# Patient Record
Sex: FEMALE | Race: ASIAN | Employment: UNEMPLOYED | URBAN - METROPOLITAN AREA
[De-identification: names, ages, dates, MRNs, and addresses within clinical notes are randomized per-mention and may not be internally consistent; named-entity substitution may affect disease eponyms.]

---

## 2019-10-11 ENCOUNTER — IMMUNIZATION (OUTPATIENT)
Dept: FAMILY MEDICINE CLINIC | Facility: CLINIC | Age: 29
End: 2019-10-11
Payer: COMMERCIAL

## 2019-10-11 DIAGNOSIS — Z23 NEED FOR VACCINATION: ICD-10-CM

## 2019-10-11 PROCEDURE — 90471 IMMUNIZATION ADMIN: CPT | Performed by: PHYSICIAN ASSISTANT

## 2019-10-11 PROCEDURE — 90686 IIV4 VACC NO PRSV 0.5 ML IM: CPT | Performed by: PHYSICIAN ASSISTANT

## 2021-04-17 ENCOUNTER — LAB ENCOUNTER (OUTPATIENT)
Dept: LAB | Age: 31
End: 2021-04-17
Attending: FAMILY MEDICINE
Payer: COMMERCIAL

## 2021-04-17 ENCOUNTER — OFFICE VISIT (OUTPATIENT)
Dept: FAMILY MEDICINE CLINIC | Facility: CLINIC | Age: 31
End: 2021-04-17
Payer: COMMERCIAL

## 2021-04-17 VITALS
DIASTOLIC BLOOD PRESSURE: 72 MMHG | TEMPERATURE: 98 F | BODY MASS INDEX: 24.84 KG/M2 | HEIGHT: 62 IN | WEIGHT: 135 LBS | HEART RATE: 76 BPM | RESPIRATION RATE: 16 BRPM | SYSTOLIC BLOOD PRESSURE: 108 MMHG | OXYGEN SATURATION: 99 %

## 2021-04-17 DIAGNOSIS — Z01.84 IMMUNITY STATUS TESTING: ICD-10-CM

## 2021-04-17 DIAGNOSIS — E55.9 HYPOVITAMINOSIS D: ICD-10-CM

## 2021-04-17 DIAGNOSIS — Z00.00 WELL ADULT EXAM: Primary | ICD-10-CM

## 2021-04-17 DIAGNOSIS — Z30.41 ENCOUNTER FOR SURVEILLANCE OF CONTRACEPTIVE PILLS: ICD-10-CM

## 2021-04-17 DIAGNOSIS — Z00.00 WELL ADULT EXAM: ICD-10-CM

## 2021-04-17 DIAGNOSIS — E04.9 ENLARGED THYROID: ICD-10-CM

## 2021-04-17 PROBLEM — Q24.9: Status: ACTIVE | Noted: 2020-10-05

## 2021-04-17 PROBLEM — Q24.9 HEART DEFECT, CONGENITAL: Status: ACTIVE | Noted: 2020-10-05

## 2021-04-17 PROCEDURE — 86658 ENTEROVIRUS ANTIBODY: CPT

## 2021-04-17 PROCEDURE — 86787 VARICELLA-ZOSTER ANTIBODY: CPT

## 2021-04-17 PROCEDURE — 86765 RUBEOLA ANTIBODY: CPT

## 2021-04-17 PROCEDURE — 84443 ASSAY THYROID STIM HORMONE: CPT

## 2021-04-17 PROCEDURE — 85025 COMPLETE CBC W/AUTO DIFF WBC: CPT | Performed by: FAMILY MEDICINE

## 2021-04-17 PROCEDURE — 84439 ASSAY OF FREE THYROXINE: CPT

## 2021-04-17 PROCEDURE — 3074F SYST BP LT 130 MM HG: CPT | Performed by: FAMILY MEDICINE

## 2021-04-17 PROCEDURE — 80061 LIPID PANEL: CPT | Performed by: FAMILY MEDICINE

## 2021-04-17 PROCEDURE — 86735 MUMPS ANTIBODY: CPT

## 2021-04-17 PROCEDURE — 86480 TB TEST CELL IMMUN MEASURE: CPT

## 2021-04-17 PROCEDURE — 36415 COLL VENOUS BLD VENIPUNCTURE: CPT

## 2021-04-17 PROCEDURE — 80053 COMPREHEN METABOLIC PANEL: CPT | Performed by: FAMILY MEDICINE

## 2021-04-17 PROCEDURE — 3078F DIAST BP <80 MM HG: CPT | Performed by: FAMILY MEDICINE

## 2021-04-17 PROCEDURE — 99385 PREV VISIT NEW AGE 18-39: CPT | Performed by: FAMILY MEDICINE

## 2021-04-17 PROCEDURE — 86762 RUBELLA ANTIBODY: CPT

## 2021-04-17 PROCEDURE — 82306 VITAMIN D 25 HYDROXY: CPT | Performed by: FAMILY MEDICINE

## 2021-04-17 PROCEDURE — 3008F BODY MASS INDEX DOCD: CPT | Performed by: FAMILY MEDICINE

## 2021-04-17 PROCEDURE — 86706 HEP B SURFACE ANTIBODY: CPT

## 2021-04-17 RX ORDER — NORGESTIMATE AND ETHINYL ESTRADIOL 7DAYSX3 LO
1 KIT ORAL DAILY
Qty: 1 PACKAGE | Refills: 3 | Status: SHIPPED | OUTPATIENT
Start: 2021-04-17 | End: 2021-07-10

## 2021-04-17 RX ORDER — NORGESTIMATE AND ETHINYL ESTRADIOL 0.25-0.035
1 KIT ORAL DAILY
COMMUNITY
Start: 2021-03-30 | End: 2021-04-17

## 2021-04-22 ENCOUNTER — TELEPHONE (OUTPATIENT)
Dept: FAMILY MEDICINE CLINIC | Facility: CLINIC | Age: 31
End: 2021-04-22

## 2021-04-22 ENCOUNTER — PATIENT MESSAGE (OUTPATIENT)
Dept: FAMILY MEDICINE CLINIC | Facility: CLINIC | Age: 31
End: 2021-04-22

## 2021-04-22 NOTE — TELEPHONE ENCOUNTER
Spoke to patient. Patient advised she will need meningococcal vaccine. Received 2nd dose of covid vaccine on 4/12/21. Patient advised to wait until 4/27 for vaccine.    Patient wants to schedule RN appt but would have to bring her 35 month old baby and

## 2021-04-22 NOTE — TELEPHONE ENCOUNTER
Meningitis needs to be completed.  She had Covid vaccine on 4-12 can you call and explain when she can get vaccines

## 2021-04-22 NOTE — TELEPHONE ENCOUNTER
Sent [employer] message requesting update on meningitis and polio vaccine records. Form placed in my red patient ppw folder.

## 2021-04-22 NOTE — TELEPHONE ENCOUNTER
From: [facility]  To: Matty Bates DO  Sent: 4/22/2021 2:01 PM CDT  Subject: Other    Hello,    Attached are my records for polio immunization. It seems like I need the meningitis.     Best,    [facility]

## 2021-04-23 NOTE — ADDENDUM NOTE
Addended by: Julienne Pitts on: 4/23/2021 09:40 AM     Modules accepted: Orders COMPLETE PHYSICAL EXAMINATION      HISTORY    The patient is a 64 year old female who comes in for a complete physical exam.  She is overdue for a mammogram and this has been ordered.  Cologuard was negative in October of 2018.  Last Pap smear was in July of 2017 and was normal and HPV negative.  Tdap was in 2018. She has a history of hypertension and tachycardia for which she takes metoprolol succinate 25 mg 2 tablets in the morning and 1 tablet at night.  Blood pressure has been elevated at home and her resting heart rate is typically in the 80s to 90s.  She does feel anxious.  Her mother has frontal lobe dementia and is living at an assisted living facility.  This has been a difficult transition for the patient as she is still an active caretaker.  She often feels up tight and this feeling has been going on for months.  She denies any depression.  She thinks her anxiety is contributing to her hypertension.          MEDICATIONS  Current Outpatient Medications   Medication Sig   • metoPROLOL succinate (TOPROL-XL) 25 MG 24 hr tablet TAKE 2 TABLETS BID   • cholecalciferol (VITAMIN D3) 1000 UNITS tablet Take 1,000 Units by mouth daily.   • MAGNESIUM CITRATE PO    • Probiotic Product (PROBIOTIC DAILY PO)    • aspirin 81 MG tablet Take 81 mg by mouth daily.   • FLUoxetine (PROzac) 10 MG capsule Take 1 capsule by mouth daily.     No current facility-administered medications for this visit.       ALLERGIES  Allergies as of 04/23/2021   • (No Known Allergies)       HISTORIES  Past Medical History:   Diagnosis Date   • Diverticulitis    • Palpitations    • Thrombocytosis (CMS/HCC)        History reviewed. No pertinent surgical history.       Family History   Problem Relation Age of Onset   • Hypertension Mother    • Dementia/Alzheimers Mother    • Hypertension Father    • Parkinsonism Father    • Psoriasis Sister         with arthritis       Social History     Occupational History   • Occupation: Professor   Tobacco Use   •  Smoking status: Never Smoker   • Smokeless tobacco: Never Used   Substance and Sexual Activity   • Alcohol use: No     Comment: rarely    • Drug use: No   • Sexual activity: Yes     Birth control/protection: Post-menopausal     Comment: LMP at 46       REVIEW OF SYSTEMS    Constitutional:  Denies weight loss, generalized fatigue, chills, night sweats.  Eyes:  Denies change in visual acuity, denies diplopia, denies photophobia.   HENT:  Denies hearing loss, tinnitus, chronic nasal congestion, sore throat, change in voice.  Respiratory:  Denies shortness of breath, chronic cough, sputum production, hemoptysis.  Cardiovascular:  Denies chest pain, palpitations, dyspnea on exertion, claudication symptoms.  Gastrointestinal:  Denies difficulty swallowing, abdominal pain, diarrhea, constipation, melena, changes in bowel habits.  Genitourinary:  Denies dysuria, hematuria, frequency, urinary incontinence, hesitancy, weak stream.  Musculoskeletal:  Denies back pain or joint pain.  Integument:  Denies rash, changes in moles, no lesions seen.  Neurologic:  Denies headache, focal weakness or sensory changes.   Endocrine:  Denies polyuria or polydipsia, denies temperature intolerance.   Lymphatic:  Denies swollen glands.   Psychiatric:  See HPI.     PHYSICAL EXAM  Vitals:  Blood pressure (!) 152/80, pulse 78, temperature 97.8 °F (36.6 °C), temperature source Temporal, resp. rate 16, height 5' 3.5\" (1.613 m), weight 81.3 kg. Body mass index is 31.25 kg/m².  General:  Well-developed and well nourished.  In no acute distress.   Skin:  No rash or unusual nevi.  HEENT:  Pupils equal, round, reactive to light and accommodation, extraocular movements intact.  The external auditory canals and tympanic membranes are normal bilaterally.  Nares are clear.  The oral mucosa is moist and without lesions.  The pharynx is clear.  Neck:  No masses, thyromegaly or adenopathy.  Breasts:  The breasts are without masses, skin retraction, or nipple  discharge.  There is no axillary adenopathy bilaterally.  Lungs:  Clear to auscultation.  No rales, rhonchi or wheezes.  Heart::  Regular rate and rhythm.  Normal S1 and S2 without S3 or S4.  No murmur, rub or gallop.  Back:  Normal thoracic kyphosis and lumbar lordosis.  Normal straight leg raises bilaterally.   Abdomen:  Soft, non-distended, non-tender.  No hepatomegaly or splenomegaly.  No masses.  Extremities:  Normal strength and tone.  No joint pain on range of motion.  No peripheral edema.      ASSESSMENT/PLAN  63 y/o female here for annual exam.  Screening mammogram, tshr, flp, cmp and cbc ordered. Metoprolol succinate increased to a total of 50 mg po bid for better control of her htn.  I also gave her a script for fluoxetine 10 mg po daily to help improve her anxiety.  I chose this over other SSRIs as she is concerned about wt gain and fatigue and this SSRI is a bit more wt neutral and activating.  She is to return to see me in June or sooner if needed.

## 2021-04-23 NOTE — TELEPHONE ENCOUNTER
The order has been signed I am still waiting for the polio virus antibody.   If she has immunization record of the polio virus that would be great as her school form does want to see immunization record for the polio

## 2021-04-26 ENCOUNTER — TELEPHONE (OUTPATIENT)
Dept: FAMILY MEDICINE CLINIC | Facility: CLINIC | Age: 31
End: 2021-04-26

## 2021-04-26 NOTE — TELEPHONE ENCOUNTER
Dr. Oshea Sides calling regarding pt's polio immunity status being non-immune. Dr. Oshea Sides requesting pt's vaccine records to be obtained and recommending pt have polio boosters.

## 2021-04-26 NOTE — TELEPHONE ENCOUNTER
Pt's vaccine records obtained. Waiting on clarification from Dr. Tressa Myles regarding the need for Polio boosters.

## 2021-04-26 NOTE — TELEPHONE ENCOUNTER
Per Dr. Leonardo Love pt does not need Polio booster after obtaining pt immunization record including Polio.

## 2021-04-27 ENCOUNTER — NURSE ONLY (OUTPATIENT)
Dept: FAMILY MEDICINE CLINIC | Facility: CLINIC | Age: 31
End: 2021-04-27
Payer: COMMERCIAL

## 2021-04-27 PROCEDURE — 90734 MENACWYD/MENACWYCRM VACC IM: CPT | Performed by: FAMILY MEDICINE

## 2021-04-27 PROCEDURE — 90471 IMMUNIZATION ADMIN: CPT | Performed by: FAMILY MEDICINE

## 2021-04-27 NOTE — PROGRESS NOTES
Dr. [facility] presents today for nurse visit. Menveo given IM in right deltoid. Patient tolerated well. Gave copy of completed paperwork. Left office in stable condition.

## 2022-02-15 ENCOUNTER — TELEMEDICINE (OUTPATIENT)
Dept: FAMILY MEDICINE CLINIC | Facility: CLINIC | Age: 32
End: 2022-02-15

## 2022-02-15 DIAGNOSIS — R42 POSTURAL DIZZINESS: ICD-10-CM

## 2022-02-15 DIAGNOSIS — R53.1 WEAKNESS: ICD-10-CM

## 2022-02-15 DIAGNOSIS — G25.2 POSTURAL TREMOR: Primary | ICD-10-CM

## 2022-02-15 PROCEDURE — 99213 OFFICE O/P EST LOW 20 MIN: CPT | Performed by: FAMILY MEDICINE

## 2022-02-15 NOTE — PROGRESS NOTES
Subjective    This visit is conducted using Telemedicine with live, interactive video and audio. Chief Complaint:  Patient presents with:  Weakness        The patient confirmed knowledge of the limitations of the use of telemedicine were verbally confirmed by the provider. Verification of patient identity was established. Verbal consent was obtained for medical treatment in lieu of coronavirus related emergency. Patient understands and accepts financial responsibility for any deductible, co-insurance and/or co-pays associated with this service. Patient complains of:  Ethan Chua is a 28year old female whois here for evaluation of dizziness. The dizziness has been present for several months but initial onset was several years ago The patient describes the symptoms as dizziness, tremors, weakness. Symptoms are exacerbated by rising from supine position. denies ear symptoms. She denies pressure  Otalgia otorrhea tinnitus hearing loss. She was diagnosed with bppv by a neurologist years ago and had done PT but that never helped. Feels that her upper body is swaying when she stands up. She has no family hx of neurological disease. No medical hx. She does have numbness and tingling diffusely. Feels weakness in her upper thoracic muscle. Notices tremors of her arms and tingling sensation periorally. When she is standing feels lik her postureal weakness. No new medications. No drug use or smoking  She is in medical school. Denies stress/anxiety out of the ordinary. No dietary change. Never had MRI. HISTORY:  History reviewed. No pertinent past medical history. History reviewed. No pertinent surgical history. History reviewed. No pertinent family history.    Social History    Tobacco Use      Smoking status: Never Smoker      Smokeless tobacco: Never Used    Vaping Use      Vaping Use: Never used    Alcohol use: Never    Drug use: Never       Review of Systems   Constitutional: Negative foratigue. No distress. Eyes: Negative for pain and visual disturbance. Respiratory: Negative for cough, chest tightness, shortness of breath and wheezing. Cardiovascular: Negative for chest pain, palpitations and leg swelling. Gastrointestinal: Negative for nausea, vomiting, abdominal pain  Genitourinary: Negative for dysuria, hematuria and difficulty urinating. Musculoskeletal: Negative for myalgias, back pain, joint swelling, arthralgias  Skin: Negative for color change and rash. Neurological: Negative for  syncope, headaches. Objective    Physical Exam:  alert, appears stated age and cooperative, Speaking in full sentences comfortably, Normal work of breathing and age appropriate and normal  Neuro: Alert/oriented x3, speech is fluent, face symmetric  Psych: Mood is stable, Affect appropriate    Assessment/Plan:    Patient with symptoms of dizziness, postural disturbance, swaying, tremors, diffuse numbness tingling, worsening symptoms. R/o anemia, metabolic deficiency, thyroid disease, infection. Referred to Neuro for further workup. 1. Postural tremor  - NEURO - INTERNAL  - CBC WITH DIFFERENTIAL WITH PLATELET  - COMP METABOLIC PANEL (14)  - TSH W REFLEX TO FREE T4  - FERRITIN  - IRON AND TIBC  - VITAMIN L24  - FOLIC ACID SERUM(FOLATE)    2. Weakness  - NEURO - INTERNAL  - CBC WITH DIFFERENTIAL WITH PLATELET  - COMP METABOLIC PANEL (14)  - TSH W REFLEX TO FREE T4  - FERRITIN  - IRON AND TIBC  - VITAMIN O94  - FOLIC ACID SERUM(FOLATE)    3. Postural dizziness  - NEURO - INTERNAL  - CBC WITH DIFFERENTIAL WITH PLATELET  - COMP METABOLIC PANEL (14)  - TSH W REFLEX TO FREE T4  - FERRITIN  - IRON AND TIBC  - VITAMIN L34  - FOLIC ACID SERUM(FOLATE)      Diagnostic rationale, follow up instructions, and strict precautions/indications for emergent direct evaluation were discussed with the patient.  The patient agrees with the plan, and understands to follow up with their primary care physician or other healthcare provider within 48-72 hours for reevaluation for persistent or worsening symptoms. Patient understands phone evaluation is not a substitute for face-to-face examination or emergency care. Patient advised to go to ER or call 911 for worsening symptoms or acute distress.          Dwight Pedro,

## 2022-03-31 ENCOUNTER — TELEMEDICINE (OUTPATIENT)
Dept: FAMILY MEDICINE CLINIC | Facility: CLINIC | Age: 32
End: 2022-03-31

## 2022-03-31 DIAGNOSIS — H81.13 BENIGN PAROXYSMAL POSITIONAL VERTIGO DUE TO BILATERAL VESTIBULAR DISORDER: Primary | ICD-10-CM

## 2022-03-31 PROCEDURE — 99442 PHONE E/M BY PHYS 11-20 MIN: CPT | Performed by: FAMILY MEDICINE

## 2022-03-31 NOTE — PROGRESS NOTES
VIRTUAL/TELEPHONE ENCOUNTER    Subjective    This visit is conducted using live telephone encounter with patient due to [facility] emergency. Chief Complaint:  Patient presents with:  Vertigo        The patient confirmed knowledge of the limitations of the use of telemedicine were verbally confirmed by the provider. Verification of patient identity was established. Verbal consent was obtained for medical treatment in lieu of coronavirus related emergency. Patient complains of:  71-year-old female who has a history of BPPV presenting for a follow-up. Patient was referred to see a neurologist couple months ago but unfortunately was not able to see them. She is requesting to see a female neurologist possibly. She has been having vertigo that is worsening. 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 She is also had some more stress related to school work and also medical condition with her son. Overall her vertigo has worsened. In school when she has labs she will need to sit on the stool but sitting in certain positions on the stool will give her dizziness. She reports rotary motion. He denies any syncope. Denies any cardiac history. She has a history of BPPV for which she underwent vestibular rehab. She would like to do this again and needs a referral for it. Patient has tried Dramamine but it made her symptoms worse. Review of Systems   Constitutional: Negative for fever, chills and fatigue. No distress. HENT: Negative for hearing loss, congestion, sore throat  Neurological: Negative for   syncope, weakness, numbness, tingling and headaches. Objective    Physical Exam:  alert and cooperative, Speaking in full sentences comfortably and Normal work of breathing    Assessment/Plan:    1. Benign paroxysmal positional vertigo due to bilateral vestibular disorder  Referral given for her to see neurology.   I have also referred her to see PT for vestibular rehab.  Notify if symptoms worsen. - OP REFERRAL TO [facility]  - NEURO - INTERNAL    Diagnostic rationale, follow up instructions, and strict precautions/indications for emergent direct evaluation were discussed with the patient. The patient agrees with the plan, and understands to follow up with their primary care physician or other healthcare provider within 48-72 hours for reevaluation for persistent or worsening symptoms. . Patient understands phone evaluation is not a substitute for face-to-face examination or emergency care. Patient advised to go to ER or call 911 for worsening symptoms or acute distress. Virtual/Telephone Check-In    Patient  verbally consents to a Virtual/Telephone Check-In service on 03/31/22  Patient understands and accepts financial responsibility for any deductible, co-insurance and/or co-pays associated with this service. TE done instead of ov due to COVID-19 emergency.      Duration of the service: 11 minutes were spent with the patient          Lise Hill DO

## 2022-06-21 ENCOUNTER — PATIENT MESSAGE (OUTPATIENT)
Dept: FAMILY MEDICINE CLINIC | Facility: CLINIC | Age: 32
End: 2022-06-21

## 2022-06-23 ENCOUNTER — PATIENT MESSAGE (OUTPATIENT)
Dept: FAMILY MEDICINE CLINIC | Facility: CLINIC | Age: 32
End: 2022-06-23

## 2022-06-23 ENCOUNTER — TELEPHONE (OUTPATIENT)
Dept: FAMILY MEDICINE CLINIC | Facility: CLINIC | Age: 32
End: 2022-06-23

## 2022-06-23 NOTE — TELEPHONE ENCOUNTER
Pt scheduled RN appt.    TB pended  Future Appointments   Date Time Provider Kim Sierra   6/24/2022 11:15 AM [facility] 20 NURSE [facility] 20 [facility] [address]

## 2022-06-23 NOTE — TELEPHONE ENCOUNTER
Pt scheduled for TB test tomorrow 6/22/22, please call pt to schedule TB read Monday at/before 11:15 am.  If pt cannot make that read appt, TB test appointment will need to be rescheduled. Thank you.

## 2022-06-24 ENCOUNTER — NURSE ONLY (OUTPATIENT)
Dept: FAMILY MEDICINE CLINIC | Facility: CLINIC | Age: 32
End: 2022-06-24
Payer: COMMERCIAL

## 2022-06-24 PROCEDURE — 86580 TB INTRADERMAL TEST: CPT | Performed by: FAMILY MEDICINE

## 2022-06-24 NOTE — PROGRESS NOTES
[facility] presents today for nurse visit. TB test given intradermal in right forearm. Patient tolerated well. Left office in stable condition.    Scheduled TB read  Future Appointments   Date Time Provider Kim Sierra   6/27/2022  9:30 AM [facility] 20 NURSE [facility] 20 [facility] [address]

## 2022-06-27 ENCOUNTER — NURSE ONLY (OUTPATIENT)
Dept: FAMILY MEDICINE CLINIC | Facility: CLINIC | Age: 32
End: 2022-06-27
Payer: COMMERCIAL

## 2022-06-27 DIAGNOSIS — Z11.1 TUBERCULOSIS SCREENING: Primary | ICD-10-CM

## 2022-06-27 LAB — INDURATION (): 0 MM (ref 0–11)

## 2022-07-18 ENCOUNTER — PATIENT MESSAGE (OUTPATIENT)
Dept: FAMILY MEDICINE CLINIC | Facility: CLINIC | Age: 32
End: 2022-07-18

## 2022-07-22 ENCOUNTER — PATIENT MESSAGE (OUTPATIENT)
Dept: FAMILY MEDICINE CLINIC | Facility: CLINIC | Age: 32
End: 2022-07-22

## 2022-07-28 ENCOUNTER — TELEMEDICINE (OUTPATIENT)
Dept: FAMILY MEDICINE CLINIC | Facility: CLINIC | Age: 32
End: 2022-07-28

## 2022-07-28 DIAGNOSIS — H81.13 BENIGN PAROXYSMAL POSITIONAL VERTIGO DUE TO BILATERAL VESTIBULAR DISORDER: Primary | ICD-10-CM

## 2022-07-28 PROCEDURE — 99213 OFFICE O/P EST LOW 20 MIN: CPT | Performed by: FAMILY MEDICINE

## 2022-07-28 NOTE — PROGRESS NOTES
VIRTUAL/TELEMEDICINE ENCOUNTER    Subjective       This visit is conducted using Telemedicine with live, interactive video and audio. Chief Complaint:  Patient presents with: Follow - Up        The patient confirmed knowledge of the limitations of the use of telemedicine were verbally confirmed by the provider. Verification of patient identity was established. Verbal consent was obtained for medical treatment in lieu of coronavirus related emergency. HPI:  28-year-old female with a history of BPPV presenting for a follow-up. She has tried to use Dramamine for the symptoms but that made her symptoms worse. She was referred to see a neurologist and referred for vestibular rehab but due to time limitation has not been able to see the specialist or go to rehab. 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 Her son has also been a priority for her due to his genetic condition therefore completing rehab was not possible yet. She reports dizziness and vertigo that occurs during prolonged sitting or if she has to focus for long period of time. She reports rotary motion. There is no cardiac or neurologic history otherwise. She needs accommodation letter with a private room to take her tests and a corner table during clinical labs to look up at the screen. Had MRI in past and it was negative. Review of Systems   Constitutional: Negative for fever, chills and fatigue. No distress. HENT: Negative for hearing loss, congestion, sore throat  Neurological: Negative for   syncope, weakness, numbness, tingling and headaches. Objective    Physical Exam:  alert and cooperative, Speaking in full sentences comfortably and Normal work of breathing    Assessment/Plan:    1. Benign paroxysmal positional vertigo due to bilateral vestibular disorder  Referral given for her to see neurology. I have also referred her to see PT for vestibular rehab. Notify if symptoms worsen.   - OP REFERRAL TO [facility]  - NEURO - INTERNAL    Diagnostic rationale, follow up instructions, and strict precautions/indications for emergent direct evaluation were discussed with the patient. The patient agrees with the plan, and understands to follow up with their primary care physician or other healthcare provider within 48-72 hours for reevaluation for persistent or worsening symptoms. . Patient understands phone evaluation is not a substitute for face-to-face examination or emergency care. Patient advised to go to ER or call 911 for worsening symptoms or acute distress.               Chava Arauz, DO

## 2022-09-21 ENCOUNTER — OFFICE VISIT (OUTPATIENT)
Dept: PHYSICAL THERAPY | Facility: HOSPITAL | Age: 32
End: 2022-09-21
Attending: FAMILY MEDICINE

## 2022-09-21 ENCOUNTER — TELEPHONE (OUTPATIENT)
Dept: PHYSICAL THERAPY | Facility: HOSPITAL | Age: 32
End: 2022-09-21

## 2022-09-21 DIAGNOSIS — H81.13 BENIGN PAROXYSMAL POSITIONAL VERTIGO DUE TO BILATERAL VESTIBULAR DISORDER: ICD-10-CM

## 2022-09-21 PROCEDURE — 97112 NEUROMUSCULAR REEDUCATION: CPT

## 2022-09-21 PROCEDURE — 97162 PT EVAL MOD COMPLEX 30 MIN: CPT

## 2022-09-21 NOTE — PROGRESS NOTES
VESTIBULAR EVALUATION:     Diagnosis:   Benign paroxysmal positional vertigo due to bilateral vestibular disorder (H81.13)   Referring Provider: Nuria Smith  Date of Evaluation:    9/21/2022    Precautions:  None Next MD visit:   none scheduled  Date of Surgery: n/a     PATIENT SUMMARY   Biju Huggins is a 28year old female who presents to therapy today with reports of low level dizziness that is chronic and can worsen in school or when she is stressed. She had \"vertigo\" back in 2011 in college, especially in large lecture halls. 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 This makes it difficult to concentrate. She had vestibular therapy in college, but it only helped so much. She denies room spinning dizziness. History/onset of current condition: has a swaying/rocking dizziness that can occur with position changes, but happens more often when she is sitting still or standing in a group setting. She notices it more in a larger, busier environment. A louder pitch/noise can make it worse. She has been to an ENT to get her ears checked, everything was normal. She denies any injuries/concussions. She travels to [address] [place] [address] every week via airplane to attend in person labs. Symptoms with cough/sneeze or loud noise: No  Falls: No  Hx of migraines: No  Hx of vision issue: No  Hx of hearing issues: none    Dizziness: Current 3/10, Best 1-2/10, Worst 9/10  (can feel better laying down)  Quality: swaying/rocking not spinning. Frequency/Duration:  All the time, can be low grade or high (highest when in a lecture hernández or lab setting). Aggravates: Supine to/from sit, Sit to stand, Bending over, Quick head movements and Looking/reaching up  Relieves: Not moving and Closing eyes    Headaches: negative    Cervical pain: random neck aches from computer work.     Dizziness Handicap Inventory [facility]): NT     Current functional limitations include: class work (Sitting is hard), clinical/lab work setting. Social history: able to jog/exercises without issue. Lives with  and 2 kids (1.5 years and 6)  James Stratton describes prior level of function: more independent (dizziness was bad in college then was calmer and then worse in school). Pt goals include decreased dizziness so she can focus on her lectures  Past medical history was reviewed with Callie. Significant findings include:  has no past medical history on file. Cordelia Betancourt presents to physical therapy evaluation with primary c/o swaying dizziness that worsens in lecture hernández settings/especially in higher seated positions. The results of the objective tests and measures show 4 line loss for DVA (difficulty stabilizing her gaze), decreased static balance in SLS and with EC, mild nystagmus in R juli hallpike positions, increased symptoms with VORc. Functional deficits include but are not limited to seated school work in [employer], lab work, walking/turning, positional changes, ADLs. Signs and symptoms are consistent with diagnosis of chronic dizziness. Performed one R sided Epley, likely not what is causing her dizziness. Explain to aJmes Stratton that due to the chronic nature of her dizziness, more of  A 3PD is suspected and that her gaze stability exercises and grounding will help, as well as staying active. Exercise does not appear to bother her. Pt and PT discussed evaluation findings, pathology, POC and HEP. Pt voiced understanding and performs HEP correctly. Skilled Physical Therapy is medically necessary to address the above impairments and reach functional goals. OBJECTIVE:   Physical Exam:  Posture/Observation: mild fwd head. Neuro Screen: Sensation: intact B UE and LE        Patellar reflexes: 3+ B  Coordination Testing:   Finger to Nose: WNL   Pronation/supination: WNL   Toe tapping: WNL     Cervical spine ROM: all WFL  Adverse neuro signs with ROM: yes no: yes a little increased dizziness.      Occulomotor & Vestibulo-Ocular Exam  Spontaneous Nystagmus: room light: none ;  fixation blocked:none  Smooth Pursuit: Negative  Saccades: Negative  Gaze Evoked Nystagmus:  room light: Negative; fixation blocked: Negative  Head Thrust: Negative  VOR screen: negative   VOR Cancellation: mild dizziness   Convergence: Negative  Cover/Uncover:  Negative  Cross Cover:  Negative  Head Shaking Nystagmus: + for mild dizziness   Dynamic Visual Acuity:  + four line loss    Positional Testing:   Heriberto-Hallpike: R + for small amplitude torsional nystagmus, L negative x2  Roll Test [facility]): negative x2    Postural Control:   Romberg: EO 30 sec, EC 30 sec   Romberg on Foam: EO 30 sec, EC 30 sec   Tandem Stance: R back EO 20 sec, EC NT sec; L back EO 20 sec, EC NT sec   SLS: R EO 5 sec, EC NT sec; L EO 15 sec, EC 7 sec     Functional Mobility:   Gait: pt ambulates on level ground with normal mechanics. Functional Gait Assessment (FGA): NT   Timed Up and Go: NT      Today's Treatment and Response:   Pt education was provided on exam findings, treatment diagnosis, treatment plan, expectations, and prognosis. Pt was also provided recommendations for activity modifications, detrimental fear avoidance behaviors, importance of remaining active and strategies to reduce fall risk at home. Patient was instructed in and issued a HEP for: 2x1 min gaze shifts horizontal and vertical performed 3x/day (for 12 minute total, will progress to 20 min quickly for chronic dizziness), seated and quadruped grounding activities as needed (Seated when in class), 2x1 min VORc. Performed 1 R sided Epley.   Charges: PT Eval Moderate Complexity, neuro re-ed:1      Total Timed Treatment: 10 min     Total Treatment Time: 45 min     Based on clinical rationale and outcome measures, this evaluation involved Moderate Complexity decision making due to 1-2 personal factors/comorbidities, 3 body structures involved/activity limitations, and evolving symptoms including lingering dizziness. PLAN OF CARE:    Goals: (to be met in 8 visits)  1. Pt will report no symptoms of dizziness for 5 consecutive days in order to return to ADLs  2. Pt will deny dizziness with positional changes to promote restful sleep at night  3. Pt will improve SLS to > 30 seconds in order to be safe while ambulating on uneven surfaces such as gravel and grass  4. Collette Snooks will be able to tolerated 20 minutes of gaze stabilization exercise in order to be able to safely drive her car or follow instructions in class without increased dizziness. 5. Pt will be independent and compliant with comprehensive HEP to maintain progress achieved in PT    Foto: 44/100    Frequency / Duration: Patient will be seen for 1-2 x/week or a total of 1-2 visits over a 30 day period. Treatment will include: home exercise program development and instruction and balance training, CRMs as needed, oculomotor and visual activities, coordination activities. Education or treatment limitation: None   Rehab Potential: good     Patient/Family/Caregiver was advised of these findings, precautions, and treatment options and has agreed to actively participate in planning and for this course of care. Thank you for your referral. Please co-sign or sign and return this letter via fax as soon as possible to [phone]. If you have any questions, please contact me at Dept: [phone]    Sincerely,  Electronically signed by therapist: Mary Montanez PT, DPT  [de-identified] certification required: Yes  I certify the need for these services furnished under this plan of treatment and while under my care.     X___________________________________________________ Date____________________    Certification From: 1/79/5467  To:12/20/2022

## 2022-09-28 ENCOUNTER — OFFICE VISIT (OUTPATIENT)
Dept: PHYSICAL THERAPY | Facility: HOSPITAL | Age: 32
End: 2022-09-28
Attending: FAMILY MEDICINE

## 2022-09-28 PROCEDURE — 97112 NEUROMUSCULAR REEDUCATION: CPT

## 2022-09-28 PROCEDURE — 95992 CANALITH REPOSITIONING PROC: CPT

## 2022-10-12 ENCOUNTER — TELEPHONE (OUTPATIENT)
Dept: PHYSICAL THERAPY | Facility: HOSPITAL | Age: 32
End: 2022-10-12

## 2022-10-13 ENCOUNTER — APPOINTMENT (OUTPATIENT)
Dept: PHYSICAL THERAPY | Facility: HOSPITAL | Age: 32
End: 2022-10-13
Attending: FAMILY MEDICINE
Payer: COMMERCIAL

## 2022-10-14 ENCOUNTER — TELEPHONE (OUTPATIENT)
Dept: FAMILY MEDICINE CLINIC | Facility: CLINIC | Age: 32
End: 2022-10-14

## 2022-10-17 ENCOUNTER — TELEPHONE (OUTPATIENT)
Dept: PHYSICAL THERAPY | Facility: HOSPITAL | Age: 32
End: 2022-10-17

## 2022-10-17 ENCOUNTER — APPOINTMENT (OUTPATIENT)
Dept: PHYSICAL THERAPY | Facility: HOSPITAL | Age: 32
End: 2022-10-17
Attending: FAMILY MEDICINE
Payer: COMMERCIAL

## 2022-10-21 ENCOUNTER — OFFICE VISIT (OUTPATIENT)
Dept: FAMILY MEDICINE CLINIC | Facility: CLINIC | Age: 32
End: 2022-10-21
Payer: COMMERCIAL

## 2022-10-21 VITALS
DIASTOLIC BLOOD PRESSURE: 74 MMHG | TEMPERATURE: 97 F | BODY MASS INDEX: 23.37 KG/M2 | OXYGEN SATURATION: 100 % | RESPIRATION RATE: 16 BRPM | HEIGHT: 62 IN | SYSTOLIC BLOOD PRESSURE: 110 MMHG | WEIGHT: 127 LBS | HEART RATE: 95 BPM

## 2022-10-21 DIAGNOSIS — Z82.49 FAMILY HISTORY OF FIRST-DEGREE RELATIVE WITH CARDIOMYOPATHY: ICD-10-CM

## 2022-10-21 DIAGNOSIS — R06.02 SHORTNESS OF BREATH: ICD-10-CM

## 2022-10-21 DIAGNOSIS — R07.9 CHEST PAIN, UNSPECIFIED TYPE: Primary | ICD-10-CM

## 2022-10-21 DIAGNOSIS — R00.2 PALPITATIONS: ICD-10-CM

## 2022-10-21 PROBLEM — H81.13 BENIGN PAROXYSMAL POSITIONAL VERTIGO DUE TO BILATERAL VESTIBULAR DISORDER: Status: ACTIVE | Noted: 2022-10-21

## 2022-10-24 ENCOUNTER — APPOINTMENT (OUTPATIENT)
Dept: PHYSICAL THERAPY | Facility: HOSPITAL | Age: 32
End: 2022-10-24
Attending: FAMILY MEDICINE
Payer: COMMERCIAL

## 2022-11-03 ENCOUNTER — APPOINTMENT (OUTPATIENT)
Dept: PHYSICAL THERAPY | Facility: HOSPITAL | Age: 32
End: 2022-11-03
Attending: FAMILY MEDICINE
Payer: COMMERCIAL

## 2022-11-09 ENCOUNTER — APPOINTMENT (OUTPATIENT)
Dept: PHYSICAL THERAPY | Facility: HOSPITAL | Age: 32
End: 2022-11-09
Attending: FAMILY MEDICINE
Payer: COMMERCIAL

## 2022-11-16 ENCOUNTER — APPOINTMENT (OUTPATIENT)
Dept: PHYSICAL THERAPY | Facility: HOSPITAL | Age: 32
End: 2022-11-16
Attending: FAMILY MEDICINE
Payer: COMMERCIAL

## 2023-01-09 ENCOUNTER — PATIENT MESSAGE (OUTPATIENT)
Dept: FAMILY MEDICINE CLINIC | Facility: CLINIC | Age: 33
End: 2023-01-09

## 2023-01-09 LAB
ATRIAL RATE: 61 BPM
P AXIS: 60 DEGREES
P-R INTERVAL: 120 MS
Q-T INTERVAL: 428 MS
QRS DURATION: 74 MS
QTC CALCULATION (BEZET): 430 MS
R AXIS: 71 DEGREES
T AXIS: 35 DEGREES
VENTRICULAR RATE: 61 BPM

## 2023-05-09 ENCOUNTER — PATIENT MESSAGE (OUTPATIENT)
Dept: FAMILY MEDICINE CLINIC | Facility: CLINIC | Age: 33
End: 2023-05-09

## 2023-05-09 ENCOUNTER — TELEPHONE (OUTPATIENT)
Dept: FAMILY MEDICINE CLINIC | Facility: CLINIC | Age: 33
End: 2023-05-09

## 2023-05-09 DIAGNOSIS — Z02.83 ENCOUNTER FOR DRUG SCREENING: ICD-10-CM

## 2023-05-09 DIAGNOSIS — Z11.1 SCREENING EXAMINATION FOR PULMONARY TUBERCULOSIS: ICD-10-CM

## 2023-05-09 DIAGNOSIS — Z00.00 LABORATORY EXAM ORDERED AS PART OF ROUTINE GENERAL MEDICAL EXAMINATION: Primary | ICD-10-CM

## 2023-05-09 NOTE — TELEPHONE ENCOUNTER
- Pt has scheduled her Annual and would like to do labs for physical.  Also patient needs a TB test and Ten panel urine drug screen for school.     Future Appointments   Date Time Provider Kim Sierra   6/5/2023 10:00 AM Itzel Tay, DO [facility] [address] [facility]

## 2023-09-13 ENCOUNTER — PATIENT MESSAGE (OUTPATIENT)
Dept: FAMILY MEDICINE CLINIC | Facility: CLINIC | Age: 33
End: 2023-09-13

## 2023-09-13 DIAGNOSIS — Z11.59 NEED FOR HEPATITIS C SCREENING TEST: Primary | ICD-10-CM

## 2023-09-16 LAB
ABSOLUTE BASOPHILS: 49 CELLS/UL (ref 0–200)
ABSOLUTE EOSINOPHILS: 98 CELLS/UL (ref 15–500)
ABSOLUTE LYMPHOCYTES: 2416 CELLS/UL (ref 850–3900)
ABSOLUTE MONOCYTES: 378 CELLS/UL (ref 200–950)
ABSOLUTE NEUTROPHILS: 3160 CELLS/UL (ref 1500–7800)
ALBUMIN/GLOBULIN RATIO: 1.4 (CALC) (ref 1–2.5)
ALBUMIN: 4.2 G/DL (ref 3.6–5.1)
ALKALINE PHOSPHATASE: 54 U/L (ref 31–125)
ALT: 14 U/L (ref 6–29)
AST: 12 U/L (ref 10–30)
BASOPHILS: 0.8 %
BILIRUBIN, TOTAL: 0.4 MG/DL (ref 0.2–1.2)
BUN: 16 MG/DL (ref 7–25)
CALCIUM: 9.3 MG/DL (ref 8.6–10.2)
CARBON DIOXIDE: 24 MMOL/L (ref 20–32)
CHLORIDE: 106 MMOL/L (ref 98–110)
CHOL/HDLC RATIO: 2.7 (CALC)
CHOLESTEROL, TOTAL: 159 MG/DL
CREATININE: 0.7 MG/DL (ref 0.5–0.97)
EGFR: 119 ML/MIN/1.73M2
EOSINOPHILS: 1.6 %
GLOBULIN: 3 G/DL (CALC) (ref 1.9–3.7)
GLUCOSE: 92 MG/DL (ref 65–99)
HDL CHOLESTEROL: 58 MG/DL
HEMATOCRIT: 37.8 % (ref 35–45)
HEMOGLOBIN: 12.2 G/DL (ref 11.7–15.5)
LDL-CHOLESTEROL: 87 MG/DL (CALC)
LYMPHOCYTES: 39.6 %
MCH: 27.8 PG (ref 27–33)
MCHC: 32.3 G/DL (ref 32–36)
MCV: 86.1 FL (ref 80–100)
MONOCYTES: 6.2 %
MPV: 10.4 FL (ref 7.5–12.5)
NEUTROPHILS: 51.8 %
NON-HDL CHOLESTEROL: 101 MG/DL (CALC)
PLATELET COUNT: 262 THOUSAND/UL (ref 140–400)
POTASSIUM: 4.3 MMOL/L (ref 3.5–5.3)
PROTEIN, TOTAL: 7.2 G/DL (ref 6.1–8.1)
RDW: 12.8 % (ref 11–15)
RED BLOOD CELL COUNT: 4.39 MILLION/UL (ref 3.8–5.1)
SODIUM: 140 MMOL/L (ref 135–146)
TRIGLYCERIDES: 56 MG/DL
TSH W/REFLEX TO FT4: 2.67 MIU/L
WHITE BLOOD CELL COUNT: 6.1 THOUSAND/UL (ref 3.8–10.8)

## 2023-10-12 ENCOUNTER — OFFICE VISIT (OUTPATIENT)
Dept: FAMILY MEDICINE CLINIC | Facility: CLINIC | Age: 33
End: 2023-10-12
Payer: COMMERCIAL

## 2023-10-12 VITALS
OXYGEN SATURATION: 98 % | DIASTOLIC BLOOD PRESSURE: 64 MMHG | SYSTOLIC BLOOD PRESSURE: 98 MMHG | BODY MASS INDEX: 21.9 KG/M2 | HEIGHT: 62 IN | WEIGHT: 119 LBS | RESPIRATION RATE: 16 BRPM | HEART RATE: 68 BPM | TEMPERATURE: 98 F

## 2023-10-12 DIAGNOSIS — E01.0 THYROMEGALY: ICD-10-CM

## 2023-10-12 DIAGNOSIS — Z00.00 WELL ADULT EXAM: Primary | ICD-10-CM

## 2023-10-12 PROCEDURE — 3074F SYST BP LT 130 MM HG: CPT | Performed by: FAMILY MEDICINE

## 2023-10-12 PROCEDURE — 3008F BODY MASS INDEX DOCD: CPT | Performed by: FAMILY MEDICINE

## 2023-10-12 PROCEDURE — 99395 PREV VISIT EST AGE 18-39: CPT | Performed by: FAMILY MEDICINE

## 2023-10-12 PROCEDURE — 3078F DIAST BP <80 MM HG: CPT | Performed by: FAMILY MEDICINE

## 2023-11-14 ENCOUNTER — TELEPHONE (OUTPATIENT)
Dept: FAMILY MEDICINE CLINIC | Facility: CLINIC | Age: 33
End: 2023-11-14

## 2023-11-14 NOTE — TELEPHONE ENCOUNTER
Pt states that she has  a yeast infection. No appointments available today in office. Please advise.

## 2023-11-14 NOTE — TELEPHONE ENCOUNTER
Spoke to pt who was able to get an appt with her OB/GYN. Also, not comfortable seeing Jemima Meyer for the issue.

## 2023-11-28 ENCOUNTER — PATIENT MESSAGE (OUTPATIENT)
Dept: FAMILY MEDICINE CLINIC | Facility: CLINIC | Age: 33
End: 2023-11-28

## 2023-11-28 NOTE — TELEPHONE ENCOUNTER
Future Appointments   Date Time Provider Kim Sierra   11/29/2023 12:40 PM AMBROSE Bob [facility] [address]

## 2023-11-29 ENCOUNTER — TELEMEDICINE (OUTPATIENT)
Dept: FAMILY MEDICINE CLINIC | Facility: CLINIC | Age: 33
End: 2023-11-29
Payer: COMMERCIAL

## 2023-11-29 DIAGNOSIS — H81.13 BENIGN PAROXYSMAL POSITIONAL VERTIGO DUE TO BILATERAL VESTIBULAR DISORDER: Primary | ICD-10-CM

## 2023-11-29 PROCEDURE — 99214 OFFICE O/P EST MOD 30 MIN: CPT | Performed by: FAMILY MEDICINE

## 2023-11-29 RX ORDER — BUSPIRONE HYDROCHLORIDE 10 MG/1
10 TABLET ORAL 3 TIMES DAILY
Qty: 90 TABLET | Refills: 0 | Status: SHIPPED | OUTPATIENT
Start: 2023-11-29 | End: 2023-12-29

## 2023-11-29 NOTE — PROGRESS NOTES
This is a telemedicine visit with live, interactive video and/or audio. Patient understands and accepts financial responsibility for any deductible, co-insurance and/or co-pays associated with this service. SUBJECTIVE  This is a 29year old female patient would like to manage systems of PPPD.  Patient states that she has discussed other options     Results for orders placed or performed in visit on 05/09/23   CBC [6399] [Q]   Result Value Ref Range    WHITE BLOOD CELL COUNT 6.1 3.8 - 10.8 Thousand/uL    RED BLOOD CELL COUNT 4.39 3.80 - 5.10 Million/uL    HEMOGLOBIN 12.2 11.7 - 15.5 g/dL    HEMATOCRIT 37.8 35.0 - 45.0 %    MCV 86.1 80.0 - 100.0 fL    MCH 27.8 27.0 - 33.0 pg    MCHC 32.3 32.0 - 36.0 g/dL    RDW 12.8 11.0 - 15.0 %    PLATELET COUNT 335 327 - 400 Thousand/uL    MPV 10.4 7.5 - 12.5 fL    ABSOLUTE NEUTROPHILS 3,160 1,500 - 7,800 cells/uL    ABSOLUTE LYMPHOCYTES 2,416 850 - 3,900 cells/uL    ABSOLUTE MONOCYTES 378 200 - 950 cells/uL    ABSOLUTE EOSINOPHILS 98 15 - 500 cells/uL    ABSOLUTE BASOPHILS 49 0 - 200 cells/uL    NEUTROPHILS 51.8 %    LYMPHOCYTES 39.6 %    MONOCYTES 6.2 %    EOSINOPHILS 1.6 %    BASOPHILS 0.8 %   COMP METABOLIC PANEL [53134] [Q]   Result Value Ref Range    GLUCOSE 92 65 - 99 mg/dL    UREA NITROGEN (BUN) 16 7 - 25 mg/dL    CREATININE 0.70 0.50 - 0.97 mg/dL    EGFR 119 > OR = 60 mL/min/1.73m2    BUN/CREATININE RATIO SEE NOTE: 6 - 22 (calc)    SODIUM 140 135 - 146 mmol/L    POTASSIUM 4.3 3.5 - 5.3 mmol/L    CHLORIDE 106 98 - 110 mmol/L    CARBON DIOXIDE 24 20 - 32 mmol/L    CALCIUM 9.3 8.6 - 10.2 mg/dL    PROTEIN, TOTAL 7.2 6.1 - 8.1 g/dL    ALBUMIN 4.2 3.6 - 5.1 g/dL    GLOBULIN 3.0 1.9 - 3.7 g/dL (calc)    ALBUMIN/GLOBULIN RATIO 1.4 1.0 - 2.5 (calc)    BILIRUBIN, TOTAL 0.4 0.2 - 1.2 mg/dL    ALKALINE PHOSPHATASE 54 31 - 125 U/L    AST 12 10 - 30 U/L    ALT 14 6 - 29 U/L   LIPID PANEL [7600] [Q]   Result Value Ref Range    CHOLESTEROL, TOTAL 159 <200 mg/dL    HDL CHOLESTEROL 58 > OR = 50 mg/dL    TRIGLYCERIDES 56 <150 mg/dL    LDL-CHOLESTEROL 87 mg/dL (calc)    CHOL/HDLC RATIO 2.7 <5.0 (calc)    NON-HDL CHOLESTEROL 101 <130 mg/dL (calc)   TSH W REFLEX TO FREE T4 [96352][Q]   Result Value Ref Range    TSH W/REFLEX TO FT4 2.67 mIU/L   HCV Antibody   Result Value Ref Range    HEPATITIS C ANTIBODY NON-REACTIVE NON-REACTIVE       HISTORY:  History reviewed. No pertinent past medical history. History reviewed. No pertinent surgical history. Family History   Problem Relation Age of Onset    Hypertension Father     Heart Disorder Mother 27    Diabetes Mother         type II    Other (hodgkin's lymphoma) Mother       Social History     Socioeconomic History    Marital status:    Tobacco Use    Smoking status: Never    Smokeless tobacco: Never   Vaping Use    Vaping Use: Never used   Substance and Sexual Activity    Alcohol use: Never    Drug use: Never        No Known Allergies   Current Outpatient Medications   Medication Sig Dispense Refill    busPIRone 10 MG Oral Tab Take 1 tablet (10 mg total) by mouth 3 (three) times daily. 90 tablet 0       OBJECTIVE  Physical Exam:   Speaking in full sentences comfortably and Normal work of breathing    ASSESSMENT & PLAN  Diagnoses and all orders for this visit:    Benign paroxysmal positional vertigo due to bilateral vestibular disorder  -     busPIRone 10 MG Oral Tab; Take 1 tablet (10 mg total) by mouth 3 (three) times daily. Patient would like to try ssri or snri for PPPD. Patient has discussed this and researched a few different options. Will chose buspirone now because it will work sooner. May try effexor during winter break. Follow Up: Follow up in 1 month. Plan discussed with patient. All questions answered. Patient is agreeable to this plan of care. Time spent on telephone call and encounter 20 min.        AMBROSE Callaway

## 2023-12-24 DIAGNOSIS — H81.13 BENIGN PAROXYSMAL POSITIONAL VERTIGO DUE TO BILATERAL VESTIBULAR DISORDER: ICD-10-CM

## 2024-01-02 NOTE — TELEPHONE ENCOUNTER
Requesting Buspirone 10mg TID  Last OV: 11/29/23 VV  RTC: 1 month  Last Rx'd 11/29/23 #90 with 0 refills    No future appointments.    Please schedule patient for 1 month follow up on medication

## 2024-01-03 ENCOUNTER — TELEPHONE (OUTPATIENT)
Dept: FAMILY MEDICINE CLINIC | Facility: CLINIC | Age: 34
End: 2024-01-03

## 2024-01-03 RX ORDER — BUSPIRONE HYDROCHLORIDE 10 MG/1
10 TABLET ORAL 3 TIMES DAILY
Qty: 90 TABLET | Refills: 0 | Status: SHIPPED | OUTPATIENT
Start: 2024-01-03 | End: 2024-02-02

## 2024-01-03 NOTE — TELEPHONE ENCOUNTER
- Eamon is calling to request a emergency supply until appointment.  Patient is out of the medication.    Future Appointments   Date Time Provider Department Center   1/5/2024 12:40 PM Armand Irby, APRN [facility] [address]      [id number] [address] AT [facility] [address], [phone], [phone]

## 2024-01-03 NOTE — TELEPHONE ENCOUNTER
Future Appointments   Date Time Provider Department Center   1/5/2024 12:40 PM Armand Irby APRN [facility] [address]     Patient scheduled VV

## 2024-01-04 RX ORDER — BUSPIRONE HYDROCHLORIDE 10 MG/1
10 TABLET ORAL 3 TIMES DAILY
Qty: 90 TABLET | Refills: 0 | OUTPATIENT
Start: 2024-01-04

## 2024-01-05 ENCOUNTER — TELEMEDICINE (OUTPATIENT)
Dept: FAMILY MEDICINE CLINIC | Facility: CLINIC | Age: 34
End: 2024-01-05
Payer: COMMERCIAL

## 2024-01-05 DIAGNOSIS — H81.13 BENIGN PAROXYSMAL POSITIONAL VERTIGO DUE TO BILATERAL VESTIBULAR DISORDER: Primary | ICD-10-CM

## 2024-01-05 PROCEDURE — 99214 OFFICE O/P EST MOD 30 MIN: CPT | Performed by: FAMILY MEDICINE

## 2024-01-05 RX ORDER — FLUOXETINE HYDROCHLORIDE 20 MG/1
20 CAPSULE ORAL DAILY
Qty: 90 CAPSULE | Refills: 0 | Status: SHIPPED | OUTPATIENT
Start: 2024-01-05 | End: 2024-04-04

## 2024-01-08 NOTE — PROGRESS NOTES
This is a telemedicine visit with live, interactive video and/or audio.     Patient understands and accepts financial responsibility for any deductible, co-insurance and/or co-pays associated with this service.    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 Patient states that she has tried to take 1.5 tablets twice a day but reports side effects with that dosage bid.     Results for orders placed or performed in visit on 05/09/23   CBC [6399] [Q]   Result Value Ref Range    WHITE BLOOD CELL COUNT 6.1 3.8 - 10.8 Thousand/uL    RED BLOOD CELL COUNT 4.39 3.80 - 5.10 Million/uL    HEMOGLOBIN 12.2 11.7 - 15.5 g/dL    HEMATOCRIT 37.8 35.0 - 45.0 %    MCV 86.1 80.0 - 100.0 fL    MCH 27.8 27.0 - 33.0 pg    MCHC 32.3 32.0 - 36.0 g/dL    RDW 12.8 11.0 - 15.0 %    PLATELET COUNT 262 140 - 400 Thousand/uL    MPV 10.4 7.5 - 12.5 fL    ABSOLUTE NEUTROPHILS 3,160 1,500 - 7,800 cells/uL    ABSOLUTE LYMPHOCYTES 2,416 850 - 3,900 cells/uL    ABSOLUTE MONOCYTES 378 200 - 950 cells/uL    ABSOLUTE EOSINOPHILS 98 15 - 500 cells/uL    ABSOLUTE BASOPHILS 49 0 - 200 cells/uL    NEUTROPHILS 51.8 %    LYMPHOCYTES 39.6 %    MONOCYTES 6.2 %    EOSINOPHILS 1.6 %    BASOPHILS 0.8 %   COMP METABOLIC PANEL [91805] [Q]   Result Value Ref Range    GLUCOSE 92 65 - 99 mg/dL    UREA NITROGEN (BUN) 16 7 - 25 mg/dL    CREATININE 0.70 0.50 - 0.97 mg/dL    EGFR 119 > OR = 60 mL/min/1.73m2    BUN/CREATININE RATIO SEE NOTE: 6 - 22 (calc)    SODIUM 140 135 - 146 mmol/L    POTASSIUM 4.3 3.5 - 5.3 mmol/L    CHLORIDE 106 98 - 110 mmol/L    CARBON DIOXIDE 24 20 - 32 mmol/L    CALCIUM 9.3 8.6 - 10.2 mg/dL    PROTEIN, TOTAL 7.2 6.1 - 8.1 g/dL    ALBUMIN 4.2 3.6 - 5.1 g/dL    GLOBULIN 3.0 1.9 - 3.7 g/dL (calc)    ALBUMIN/GLOBULIN RATIO 1.4 1.0 - 2.5 (calc)    BILIRUBIN, TOTAL 0.4 0.2 - 1.2 mg/dL     ALKALINE PHOSPHATASE 54 31 - 125 U/L    AST 12 10 - 30 U/L    ALT 14 6 - 29 U/L   LIPID PANEL [7600] [Q]   Result Value Ref Range    CHOLESTEROL, TOTAL 159 <200 mg/dL    HDL CHOLESTEROL 58 > OR = 50 mg/dL    TRIGLYCERIDES 56 <150 mg/dL    LDL-CHOLESTEROL 87 mg/dL (calc)    CHOL/HDLC RATIO 2.7 <5.0 (calc)    NON-HDL CHOLESTEROL 101 <130 mg/dL (calc)   TSH W REFLEX TO FREE T4 [[id number]][Q]   Result Value Ref Range    TSH W/REFLEX TO FT4 2.67 mIU/L   HCV Antibody   Result Value Ref Range    HEPATITIS C ANTIBODY NON-REACTIVE NON-REACTIVE       HISTORY:  History reviewed. No pertinent past medical history.   History reviewed. No pertinent surgical history.   Family History   Problem Relation Age of Onset    Hypertension Father     Heart Disorder Mother 30    Diabetes Mother         type II    Other (hodgkin's lymphoma) Mother       Social History     Socioeconomic History    Marital status:    Tobacco Use    Smoking status: Never    Smokeless tobacco: Never   Vaping Use    Vaping Use: Never used   Substance and Sexual Activity    Alcohol use: Never    Drug use: Never        No Known Allergies   Current Outpatient Medications   Medication Sig Dispense Refill    FLUoxetine 20 MG Oral Cap Take 1 capsule (20 mg total) by mouth daily. 90 capsule 0    busPIRone 10 MG Oral Tab Take 1 tablet (10 mg total) by mouth 3 (three) times daily. 90 tablet 0       OBJECTIVE  Physical Exam:   Speaking in full sentences comfortably and Normal work of breathing    ASSESSMENT & PLAN  Diagnoses and all orders for this visit:    Benign paroxysmal positional vertigo due to bilateral vestibular disorder    Other orders  -     FLUoxetine 20 MG Oral Cap; Take 1 capsule (20 mg total) by mouth daily.    Change in medication to hopefully ease therapy regimen.   Follow up in 1 month to assess for changes, effectiveness, and/or side effects.       Plan discussed with patient. All questions answered. Patient is agreeable to this plan of care.      Time spent on telephone call and encounter 20min.       AMBROSE Macias

## 2024-01-09 ENCOUNTER — TELEMEDICINE (OUTPATIENT)
Dept: FAMILY MEDICINE CLINIC | Facility: CLINIC | Age: 34
End: 2024-01-09
Payer: COMMERCIAL

## 2024-01-09 DIAGNOSIS — H81.13 BPPV (BENIGN PAROXYSMAL POSITIONAL VERTIGO), BILATERAL: Primary | ICD-10-CM

## 2024-01-09 PROCEDURE — 99213 OFFICE O/P EST LOW 20 MIN: CPT | Performed by: FAMILY MEDICINE

## 2024-01-09 RX ORDER — DULOXETIN HYDROCHLORIDE 20 MG/1
20 CAPSULE, DELAYED RELEASE ORAL DAILY
Qty: 30 CAPSULE | Refills: 0 | Status: SHIPPED | OUTPATIENT
Start: 2024-01-09 | End: 2024-02-08

## 2024-01-10 NOTE — PROGRESS NOTES
This is a telemedicine visit with live, interactive video and/or audio.     Patient understands and accepts financial responsibility for any deductible, co-insurance and/or co-pays associated with this service.    SUBJECTIVE  This is a 33 year old female patient presents via video to request a change from fluoxetine to duloxetine. Patient states that she has not picked up the fluoxetine from the pharmacy and would rather start the duloxetine because it has a better side effect profile.     Results for orders placed or performed in visit on 05/09/23   CBC [6399] [Q]   Result Value Ref Range    WHITE BLOOD CELL COUNT 6.1 3.8 - 10.8 Thousand/uL    RED BLOOD CELL COUNT 4.39 3.80 - 5.10 Million/uL    HEMOGLOBIN 12.2 11.7 - 15.5 g/dL    HEMATOCRIT 37.8 35.0 - 45.0 %    MCV 86.1 80.0 - 100.0 fL    MCH 27.8 27.0 - 33.0 pg    MCHC 32.3 32.0 - 36.0 g/dL    RDW 12.8 11.0 - 15.0 %    PLATELET COUNT 262 140 - 400 Thousand/uL    MPV 10.4 7.5 - 12.5 fL    ABSOLUTE NEUTROPHILS 3,160 1,500 - 7,800 cells/uL    ABSOLUTE LYMPHOCYTES 2,416 850 - 3,900 cells/uL    ABSOLUTE MONOCYTES 378 200 - 950 cells/uL    ABSOLUTE EOSINOPHILS 98 15 - 500 cells/uL    ABSOLUTE BASOPHILS 49 0 - 200 cells/uL    NEUTROPHILS 51.8 %    LYMPHOCYTES 39.6 %    MONOCYTES 6.2 %    EOSINOPHILS 1.6 %    BASOPHILS 0.8 %   COMP METABOLIC PANEL [40552] [Q]   Result Value Ref Range    GLUCOSE 92 65 - 99 mg/dL    UREA NITROGEN (BUN) 16 7 - 25 mg/dL    CREATININE 0.70 0.50 - 0.97 mg/dL    EGFR 119 > OR = 60 mL/min/1.73m2    BUN/CREATININE RATIO SEE NOTE: 6 - 22 (calc)    SODIUM 140 135 - 146 mmol/L    POTASSIUM 4.3 3.5 - 5.3 mmol/L    CHLORIDE 106 98 - 110 mmol/L    CARBON DIOXIDE 24 20 - 32 mmol/L    CALCIUM 9.3 8.6 - 10.2 mg/dL    PROTEIN, TOTAL 7.2 6.1 - 8.1 g/dL    ALBUMIN 4.2 3.6 - 5.1 g/dL    GLOBULIN 3.0 1.9 - 3.7 g/dL (calc)    ALBUMIN/GLOBULIN RATIO 1.4 1.0 - 2.5 (calc)    BILIRUBIN, TOTAL 0.4 0.2 - 1.2 mg/dL    ALKALINE PHOSPHATASE 54 31 - 125 U/L    AST 12 10  - 30 U/L    ALT 14 6 - 29 U/L   LIPID PANEL [7600] [Q]   Result Value Ref Range    CHOLESTEROL, TOTAL 159 <200 mg/dL    HDL CHOLESTEROL 58 > OR = 50 mg/dL    TRIGLYCERIDES 56 <150 mg/dL    LDL-CHOLESTEROL 87 mg/dL (calc)    CHOL/HDLC RATIO 2.7 <5.0 (calc)    NON-HDL CHOLESTEROL 101 <130 mg/dL (calc)   TSH W REFLEX TO FREE T4 [[id number]][Q]   Result Value Ref Range    TSH W/REFLEX TO FT4 2.67 mIU/L   HCV Antibody   Result Value Ref Range    HEPATITIS C ANTIBODY NON-REACTIVE NON-REACTIVE       HISTORY:  No past medical history on file.   No past surgical history on file.   Family History   Problem Relation Age of Onset    Hypertension Father     Heart Disorder Mother 30    Diabetes Mother         type II    Other (hodgkin's lymphoma) Mother       Social History     Socioeconomic History    Marital status:    Tobacco Use    Smoking status: Never    Smokeless tobacco: Never   Vaping Use    Vaping Use: Never used   Substance and Sexual Activity    Alcohol use: Never    Drug use: Never        No Known Allergies   Current Outpatient Medications   Medication Sig Dispense Refill    DULoxetine 20 MG Oral Cap DR Particles Take 1 capsule (20 mg total) by mouth daily. 30 capsule 0    busPIRone 10 MG Oral Tab Take 1 tablet (10 mg total) by mouth 3 (three) times daily. 90 tablet 0       OBJECTIVE  Physical Exam:   Speaking in full sentences comfortably and Normal work of breathing    ASSESSMENT & PLAN  Diagnoses and all orders for this visit:    BPPV (benign paroxysmal positional vertigo), bilateral  -     DULoxetine 20 MG Oral Cap DR Particles; Take 1 capsule (20 mg total) by mouth daily.    Understanding the vestibular system   The vestibular system of the ear is made up of very tiny parts. They include the utricle, saccule, and semicircular canals. The utricle is a tiny organ that contains calcium crystals. In some people, the crystals can move into the semicircular canals. When this happens, the system no longer works as it  should. This causes BPPV. Benign means it's not life threatening. Paroxysmal means it comes in sudden brief spells. Positional means that it is triggered by certain head movements or positions. Vertigo is a feeling of spinning.   What causes BPPV?  Causes include injury to your head or neck. Other problems with the vestibular system may cause BPPV. In many people, the cause of BPPV is not known.   Symptoms of BPPV  You may have repeated feelings of spinning (vertigo). The vertigo usually lasts less than 1 minute. Some movements, such as rolling over in bed, can bring on vertigo.   Diagnosing BPPV  Your primary healthcare provider may diagnose and treat your BPPV. In some cases, you may be referred to an ear, nose, and throat specialist (otolaryngologist) or a nervous  (neurologist).   The healthcare provider will ask about your symptoms and your medical history. You will be examined and have hearing and balance tests performed. As part of the exam, your healthcare provider may have you move your head and body in certain ways. If you have BPPV, certain types of movements can bring on vertigo. Your provider will also look for abnormal movements of your eyes. You may have other tests to check your vestibular or nervous system.   Treatment for BPPV  By using certain maneuvers your healthcare provider will try to guide the calcium crystals back to the chambers where they should be. This is done by having you move your head and neck in certain ways. This treatment is safe and often works well. You may also be told to do these movements at home. You may still have vertigo for a few weeks. Your healthcare provider will recheck your symptoms, usually in about a month. Special physical therapy may also be part of treatment. In rare cases, surgery may be needed for BPPV that does not go away. Talk with your healthcare provider about whether it's safe for you to drive.      Your health care provider may move  your head in certain ways to treat your BPPV.      When to call the healthcare provider  Call your healthcare provider right away if you have any of these:   Symptoms that don't go away with treatment  Symptoms that get worse  New symptoms    Follow Up:  Follow up as needed.       Plan discussed with patient. All questions answered. Patient is agreeable to this plan of care.     Time spent on telephone call and encounter 10 min.       AMBROSE Macias

## 2024-02-03 ENCOUNTER — TELEMEDICINE (OUTPATIENT)
Dept: FAMILY MEDICINE CLINIC | Facility: CLINIC | Age: 34
End: 2024-02-03
Payer: COMMERCIAL

## 2024-02-03 DIAGNOSIS — F41.9 ANXIETY: ICD-10-CM

## 2024-02-03 DIAGNOSIS — H81.13 BENIGN PAROXYSMAL POSITIONAL VERTIGO DUE TO BILATERAL VESTIBULAR DISORDER: Primary | ICD-10-CM

## 2024-02-03 RX ORDER — BUSPIRONE HYDROCHLORIDE 10 MG/1
10 TABLET ORAL 3 TIMES DAILY
COMMUNITY
Start: 2023-11-29 | End: 2024-02-03

## 2024-02-03 RX ORDER — BUSPIRONE HYDROCHLORIDE 10 MG/1
10 TABLET ORAL 3 TIMES DAILY
Qty: 270 TABLET | Refills: 1 | Status: SHIPPED | OUTPATIENT
Start: 2024-02-03 | End: 2024-08-01

## 2024-02-03 RX ORDER — DULOXETIN HYDROCHLORIDE 30 MG/1
30 CAPSULE, DELAYED RELEASE ORAL DAILY
Qty: 90 CAPSULE | Refills: 1 | Status: SHIPPED | OUTPATIENT
Start: 2024-02-03 | End: 2024-08-01

## 2024-02-03 NOTE — PROGRESS NOTES
Subjective    This visit is conducted using Telemedicine with live, interactive video and audio.    Chief Complaint:  Chief Complaint   Patient presents with    Vertigo         The patient confirmed knowledge of the limitations of the use of telemedicine were verbally confirmed by the provider.  Verification of patient identity was established.  Patient understands and accepts financial responsibility for any deductible, co-insurance and/or co-pays associated with this service.      HPI:  Patient is doing a follow up for flare of BPPV and anxiety that was worsening about 2 months ago.  She is a medical student. Reports having bout of dizziness during her work. Patient saw Armand FOOTE. She was started on buspar 10mg TID. This helped a lot. However it was cumbersome to take TID.  She is also taking cymbalta 20mg daily with the buspar. Patient sleeps well at night. The vertigo has improved greatly. Reports that her hand tremor is resolved. She has minimized her caffeine intake as well.   Denies SE to meds.     Review of Systems   Negative.     HISTORY:  History reviewed. No pertinent past medical history.   History reviewed. No pertinent surgical history.   Family History   Problem Relation Age of Onset    Hypertension Father     Heart Disorder Mother 30    Diabetes Mother         type II    Other (hodgkin's lymphoma) Mother       Social History     Socioeconomic History    Marital status:    Tobacco Use    Smoking status: Never    Smokeless tobacco: Never   Vaping Use    Vaping Use: Never used   Substance and Sexual Activity    Alcohol use: Never    Drug use: Never              Objective    Physical Exam:  alert, appears stated age, and cooperative, Speaking in full sentences comfortably, Normal work of breathing, and age appropriate, normal, and logical connections  Psych: Mood is stable, Affect appropriate    Assessment/Plan:  1. Benign paroxysmal positional vertigo due to bilateral vestibular disorder  -  DULoxetine (CYMBALTA) 30 MG Oral Cap DR Particles; Take 1 capsule (30 mg total) by mouth daily.  Dispense: 90 capsule; Refill: 1  - busPIRone 10 MG Oral Tab; Take 1 tablet (10 mg total) by mouth 3 (three) times daily.  Dispense: 270 tablet; Refill: 1    2. Anxiety  Increased cymbalta to 30mg daily.   Patient to follow up as needed. Will reassess at her annual physical.   Continue buspar at 10mg tid.   - DULoxetine (CYMBALTA) 30 MG Oral Cap DR Particles; Take 1 capsule (30 mg total) by mouth daily.  Dispense: 90 capsule; Refill: 1  - busPIRone 10 MG Oral Tab; Take 1 tablet (10 mg total) by mouth 3 (three) times daily.  Dispense: 270 tablet; Refill: 1  Pt aware of SE to medications and to monitor her symptoms.       Diagnostic rationale, follow up instructions, and strict precautions/indications for emergent direct evaluation were discussed with the patient. The patient agrees with the plan, and understands to follow up with their primary care physician or other healthcare provider within 48-72 hours for reevaluation for persistent or worsening symptoms.    Patient understands phone evaluation is not a substitute for face-to-face examination or emergency care. Patient advised to go to ER or call 911 for worsening symptoms or acute distress.         Najma Tay DO

## 2024-04-21 LAB
MITOGEN-NIL: 9.19 IU/ML
NIL: 0.18 IU/ML
QUANTIFERON(R)-TB GOLD PLUS, 1 TUBE: NEGATIVE
TB1-NIL: 0.11 IU/ML
TB2-NIL: 0.02 IU/ML

## 2024-05-08 DIAGNOSIS — H81.13 BENIGN PAROXYSMAL POSITIONAL VERTIGO DUE TO BILATERAL VESTIBULAR DISORDER: ICD-10-CM

## 2024-05-08 DIAGNOSIS — F41.9 ANXIETY: ICD-10-CM

## 2024-05-08 RX ORDER — DULOXETIN HYDROCHLORIDE 30 MG/1
30 CAPSULE, DELAYED RELEASE ORAL DAILY
Qty: 90 CAPSULE | Refills: 1 | Status: SHIPPED | OUTPATIENT
Start: 2024-05-08

## 2024-05-08 NOTE — TELEPHONE ENCOUNTER
Name from pharmacy: DULOXETINE DR 30MG CAPSULES         Will file in chart as: DULOXETINE 30 MG Oral Cap DR Particles    Sig: TAKE 1 CAPSULE(30 MG) BY MOUTH DAILY    Disp: 90 capsule    Refills: 1 (Pharmacy requested: Not specified)    Start: 5/8/2024    Class: Normal    Non-formulary For: Benign paroxysmal positional vertigo due to bilateral vestibular disorder, Anxiety    To pharmacy: ZERO refills remain on this prescription. Your patient is requesting advance approval of refills for this medication to PREVENT ANY MISSED DOSES    Last ordered: 3 months ago (2/3/2024) by Najma Tay DO    Last refill: 5/4/2024    Rx #: [id number]    Psychiatric Non-Scheduled (Anti-Anxiety) [id number]/08/2024 08:00 AM   Protocol Details In person appointment or virtual visit in the past 6 mos or appointment in next 3 mos    Depression Screening completed within the past 12 months      To be filled at: [id number] [address] AT [facility] [address], [phone], [phone]

## 2024-07-31 DIAGNOSIS — F41.9 ANXIETY: ICD-10-CM

## 2024-07-31 DIAGNOSIS — H81.13 BENIGN PAROXYSMAL POSITIONAL VERTIGO DUE TO BILATERAL VESTIBULAR DISORDER: ICD-10-CM

## 2024-08-01 RX ORDER — BUSPIRONE HYDROCHLORIDE 10 MG/1
10 TABLET ORAL 3 TIMES DAILY
Qty: 270 TABLET | Refills: 1 | Status: SHIPPED | OUTPATIENT
Start: 2024-08-01

## 2024-08-01 NOTE — TELEPHONE ENCOUNTER
Requesting Buspirone 10mg  Last OV: 2/3/24 Telemedicine  RTC: not noted  Last Rx'd 2/3/24 #270 with 1 refill    No future appointments.    Non-protocol med:  Rx pended and routed for approval/denial

## 2024-12-26 DIAGNOSIS — H81.13 BPPV (BENIGN PAROXYSMAL POSITIONAL VERTIGO), BILATERAL: ICD-10-CM

## 2024-12-27 NOTE — TELEPHONE ENCOUNTER
Requesting Duloxetine 20mg  Last OV: 2/3/24 Telemedicine  RTC: prn      No future appointments.    Rx was increased to 30mg at last visit on 2/3/24    Please schedule appointment, patient is overdue for follow up

## 2024-12-29 DIAGNOSIS — F41.9 ANXIETY: ICD-10-CM

## 2024-12-29 DIAGNOSIS — H81.13 BENIGN PAROXYSMAL POSITIONAL VERTIGO DUE TO BILATERAL VESTIBULAR DISORDER: ICD-10-CM

## 2024-12-30 RX ORDER — BUSPIRONE HYDROCHLORIDE 10 MG/1
10 TABLET ORAL 3 TIMES DAILY
Qty: 270 TABLET | Refills: 1 | Status: SHIPPED | OUTPATIENT
Start: 2024-12-30

## 2024-12-30 NOTE — TELEPHONE ENCOUNTER
Requesting Buspirone 10mg  Last OV: 2/3/24 Telemedicine  RTC: not noted  Last Rx'd 8/1/24 #270 with 0 refills    No future appointments.    Non-protocol med:  Rx pended and routed for approval/denial

## 2025-01-09 RX ORDER — DULOXETIN HYDROCHLORIDE 20 MG/1
20 CAPSULE, DELAYED RELEASE ORAL DAILY
Qty: 30 CAPSULE | Refills: 0 | OUTPATIENT
Start: 2025-01-09

## 2025-02-13 DIAGNOSIS — F41.9 ANXIETY: ICD-10-CM

## 2025-02-13 DIAGNOSIS — H81.13 BENIGN PAROXYSMAL POSITIONAL VERTIGO DUE TO BILATERAL VESTIBULAR DISORDER: ICD-10-CM

## 2025-02-13 NOTE — TELEPHONE ENCOUNTER
Requesting Duloxetine 30mg  LOV: 2/3/24 Telemedicine  RTC: prn  Last Relevant Labs:   Filled: 5/8/24 #90 with 1 refills    No future appointments.    Psychiatric Non-Scheduled (Anti-Anxiety) [id number]/13/2025 03:39 PM   Protocol Details   In person appointment or virtual visit in the past 6 mos or appointment in next 3 mos    Depression Screening completed within the past 12 months    Medication is active on med list     Patient has not been seen in over a year, please schedule appointment.

## 2025-02-14 NOTE — TELEPHONE ENCOUNTER
Future Appointments   Date Time Provider Department Center   2/15/2025  9:30 AM Najma Tay,  [facility] [address] [facility] [address]

## 2025-02-14 NOTE — TELEPHONE ENCOUNTER
LEFT MESSAGE ON MACHINE appointment needed in person. Also informed her Armand has left the practice.

## 2025-02-15 PROBLEM — E55.9 HYPOVITAMINOSIS D: Status: ACTIVE | Noted: 2025-02-15

## 2025-02-15 PROBLEM — F41.9 ANXIETY: Status: ACTIVE | Noted: 2025-02-15

## 2025-02-19 RX ORDER — DULOXETIN HYDROCHLORIDE 30 MG/1
30 CAPSULE, DELAYED RELEASE ORAL DAILY
Qty: 90 CAPSULE | Refills: 1 | OUTPATIENT
Start: 2025-02-19

## (undated) NOTE — LETTER
Date: 7/28/2022    Patient Name: [facility]          To Whom it may concern: This letter has been written at the patient's request. The above patient was seen at the [facility] for treatment of a medical condition. Nitesh Mahoney will need a private room for her to take her academic exams, she will need a chair with arm rests as well. She will also need to have a corner table for all clinical labs due to her condition. This will be for the entire academic year from 7483-3862. If you have any questions please contact me at [phone].     Sincerely,    Carey Vegas, DO

## (undated) NOTE — LETTER
Date: 7/28/2022    Patient Name: [facility]          To Whom it may concern: This letter has been written at the patient's request. The above patient was seen at the [facility] for treatment of a medical condition. Antonella Lindsey will need a private room for her to take her academic exams, she will need a chair with arm rests as well. She will also need to have a corner table for all clinical labs due to her condition. This will be for the entire academic year from 5317-5481    If you have any questions please contact me at [phone].     Sincerely,    Ambar Ro, DO

## (undated) NOTE — LETTER
Date: 3/31/2022    Patient Name: [facility]          To Whom it may concern: This letter has been written at the patient's request. The above patient was seen at the [facility] for treatment of a medical condition. This patient should be excused from attending work/school on 3/29/22.          Sincerely,    Kate Hassan DO

## (undated) NOTE — LETTER
Date: 1/10/2023    [facility]          To Whom it may concern:     This letter has been written at the patient's request.     This patient should be excused from attending work/school on 1/9/23    The patient may return to work/school on 1/10/23 with no limitations        Sincerely,    Tammi Keyes DO